# Patient Record
Sex: FEMALE | Race: WHITE | NOT HISPANIC OR LATINO | Employment: FULL TIME | URBAN - METROPOLITAN AREA
[De-identification: names, ages, dates, MRNs, and addresses within clinical notes are randomized per-mention and may not be internally consistent; named-entity substitution may affect disease eponyms.]

---

## 2023-07-24 ENCOUNTER — APPOINTMENT (OUTPATIENT)
Dept: URGENT CARE | Age: 58
End: 2023-07-24

## 2023-07-24 ENCOUNTER — OCC HEALTH (OUTPATIENT)
Dept: URGENT CARE | Age: 58
End: 2023-07-24

## 2023-07-24 DIAGNOSIS — Z11.1 SCREENING-PULMONARY TB: Primary | ICD-10-CM

## 2023-07-24 PROCEDURE — 86580 TB INTRADERMAL TEST: CPT | Performed by: NURSE PRACTITIONER

## 2023-07-26 ENCOUNTER — OCC HEALTH (OUTPATIENT)
Dept: URGENT CARE | Age: 58
End: 2023-07-26

## 2023-07-26 DIAGNOSIS — Z11.1 SCREENING FOR TUBERCULOSIS: Primary | ICD-10-CM

## 2023-07-26 LAB
INDURATION: 0 MM (ref 0–?)
SKIN TEST RESULT: NEGATIVE

## 2025-01-28 ENCOUNTER — TELEPHONE (OUTPATIENT)
Dept: OBGYN CLINIC | Facility: CLINIC | Age: 60
End: 2025-01-28

## 2025-01-28 NOTE — TELEPHONE ENCOUNTER
Outgoing call to patient to notify Dr Julio will be out of the office on 2/18/25. This PSR LDMTCB to reschedule appointment.

## 2025-01-30 ENCOUNTER — LAB ENCOUNTER (OUTPATIENT)
Dept: LAB | Facility: REFERENCE LAB | Age: 60
End: 2025-01-30
Attending: FAMILY MEDICINE
Payer: COMMERCIAL

## 2025-01-30 ENCOUNTER — OFFICE VISIT (OUTPATIENT)
Facility: CLINIC | Age: 60
End: 2025-01-30
Payer: COMMERCIAL

## 2025-01-30 VITALS
HEART RATE: 64 BPM | HEIGHT: 61.5 IN | DIASTOLIC BLOOD PRESSURE: 68 MMHG | BODY MASS INDEX: 20.5 KG/M2 | WEIGHT: 110 LBS | OXYGEN SATURATION: 98 % | SYSTOLIC BLOOD PRESSURE: 110 MMHG

## 2025-01-30 DIAGNOSIS — M54.50 CHRONIC RIGHT-SIDED LOW BACK PAIN WITHOUT SCIATICA: ICD-10-CM

## 2025-01-30 DIAGNOSIS — M21.962 DEFORMITY OF LEFT FOOT: ICD-10-CM

## 2025-01-30 DIAGNOSIS — M25.50 POLYARTHRALGIA: ICD-10-CM

## 2025-01-30 DIAGNOSIS — G89.29 CHRONIC RIGHT-SIDED LOW BACK PAIN WITHOUT SCIATICA: ICD-10-CM

## 2025-01-30 DIAGNOSIS — Z12.11 COLON CANCER SCREENING: ICD-10-CM

## 2025-01-30 DIAGNOSIS — Z01.00 ENCOUNTER FOR VISION SCREENING: ICD-10-CM

## 2025-01-30 DIAGNOSIS — Z00.00 PHYSICAL EXAM, ANNUAL: ICD-10-CM

## 2025-01-30 DIAGNOSIS — N81.4 UTERINE PROLAPSE: ICD-10-CM

## 2025-01-30 DIAGNOSIS — Z00.00 PHYSICAL EXAM, ANNUAL: Primary | ICD-10-CM

## 2025-01-30 DIAGNOSIS — Z12.31 ENCOUNTER FOR SCREENING MAMMOGRAM FOR MALIGNANT NEOPLASM OF BREAST: ICD-10-CM

## 2025-01-30 DIAGNOSIS — R79.89 ELEVATED TSH: ICD-10-CM

## 2025-01-30 LAB
ALBUMIN SERPL-MCNC: 4.6 G/DL (ref 3.2–4.8)
ALBUMIN/GLOB SERPL: 1.6 {RATIO} (ref 1–2)
ALP LIVER SERPL-CCNC: 51 U/L
ALT SERPL-CCNC: 18 U/L
ANION GAP SERPL CALC-SCNC: 8 MMOL/L (ref 0–18)
AST SERPL-CCNC: 31 U/L (ref ?–34)
BASOPHILS # BLD AUTO: 0.03 X10(3) UL (ref 0–0.2)
BASOPHILS NFR BLD AUTO: 0.6 %
BILIRUB SERPL-MCNC: 0.4 MG/DL (ref 0.2–1.1)
BUN BLD-MCNC: 20 MG/DL (ref 9–23)
BUN/CREAT SERPL: 21.5 (ref 10–20)
CALCIUM BLD-MCNC: 10 MG/DL (ref 8.7–10.4)
CHLORIDE SERPL-SCNC: 104 MMOL/L (ref 98–112)
CHOLEST SERPL-MCNC: 225 MG/DL (ref ?–200)
CO2 SERPL-SCNC: 28 MMOL/L (ref 21–32)
CREAT BLD-MCNC: 0.93 MG/DL
DEPRECATED RDW RBC AUTO: 41.9 FL (ref 35.1–46.3)
EGFRCR SERPLBLD CKD-EPI 2021: 70 ML/MIN/1.73M2 (ref 60–?)
EOSINOPHIL # BLD AUTO: 0.1 X10(3) UL (ref 0–0.7)
EOSINOPHIL NFR BLD AUTO: 2 %
ERYTHROCYTE [DISTWIDTH] IN BLOOD BY AUTOMATED COUNT: 12.7 % (ref 11–15)
EST. AVERAGE GLUCOSE BLD GHB EST-MCNC: 114 MG/DL (ref 68–126)
FASTING PATIENT LIPID ANSWER: NO
FASTING STATUS PATIENT QL REPORTED: NO
GLOBULIN PLAS-MCNC: 2.8 G/DL (ref 2–3.5)
GLUCOSE BLD-MCNC: 86 MG/DL (ref 70–99)
HBA1C MFR BLD: 5.6 % (ref ?–5.7)
HCT VFR BLD AUTO: 37.9 %
HCV AB SERPL QL IA: NONREACTIVE
HDLC SERPL-MCNC: 79 MG/DL (ref 40–59)
HGB BLD-MCNC: 12.3 G/DL
IMM GRANULOCYTES # BLD AUTO: 0.01 X10(3) UL (ref 0–1)
IMM GRANULOCYTES NFR BLD: 0.2 %
LDLC SERPL CALC-MCNC: 135 MG/DL (ref ?–100)
LYMPHOCYTES # BLD AUTO: 1.48 X10(3) UL (ref 1–4)
LYMPHOCYTES NFR BLD AUTO: 30.3 %
MCH RBC QN AUTO: 28.9 PG (ref 26–34)
MCHC RBC AUTO-ENTMCNC: 32.5 G/DL (ref 31–37)
MCV RBC AUTO: 89.2 FL
MONOCYTES # BLD AUTO: 0.42 X10(3) UL (ref 0.1–1)
MONOCYTES NFR BLD AUTO: 8.6 %
NEUTROPHILS # BLD AUTO: 2.85 X10 (3) UL (ref 1.5–7.7)
NEUTROPHILS # BLD AUTO: 2.85 X10(3) UL (ref 1.5–7.7)
NEUTROPHILS NFR BLD AUTO: 58.3 %
NONHDLC SERPL-MCNC: 146 MG/DL (ref ?–130)
OSMOLALITY SERPL CALC.SUM OF ELEC: 292 MOSM/KG (ref 275–295)
PLATELET # BLD AUTO: 242 10(3)UL (ref 150–450)
POTASSIUM SERPL-SCNC: 4 MMOL/L (ref 3.5–5.1)
PROT SERPL-MCNC: 7.4 G/DL (ref 5.7–8.2)
RBC # BLD AUTO: 4.25 X10(6)UL
SODIUM SERPL-SCNC: 140 MMOL/L (ref 136–145)
T4 FREE SERPL-MCNC: 0.8 NG/DL (ref 0.8–1.7)
TRIGL SERPL-MCNC: 66 MG/DL (ref 30–149)
TSI SER-ACNC: 9.95 UIU/ML (ref 0.55–4.78)
VLDLC SERPL CALC-MCNC: 12 MG/DL (ref 0–30)
WBC # BLD AUTO: 4.9 X10(3) UL (ref 4–11)

## 2025-01-30 PROCEDURE — 84443 ASSAY THYROID STIM HORMONE: CPT

## 2025-01-30 PROCEDURE — 85025 COMPLETE CBC W/AUTO DIFF WBC: CPT

## 2025-01-30 PROCEDURE — 90471 IMMUNIZATION ADMIN: CPT | Performed by: FAMILY MEDICINE

## 2025-01-30 PROCEDURE — 86376 MICROSOMAL ANTIBODY EACH: CPT

## 2025-01-30 PROCEDURE — 80053 COMPREHEN METABOLIC PANEL: CPT

## 2025-01-30 PROCEDURE — 83036 HEMOGLOBIN GLYCOSYLATED A1C: CPT

## 2025-01-30 PROCEDURE — 86803 HEPATITIS C AB TEST: CPT

## 2025-01-30 PROCEDURE — 90677 PCV20 VACCINE IM: CPT | Performed by: FAMILY MEDICINE

## 2025-01-30 PROCEDURE — 99386 PREV VISIT NEW AGE 40-64: CPT | Performed by: FAMILY MEDICINE

## 2025-01-30 PROCEDURE — 36415 COLL VENOUS BLD VENIPUNCTURE: CPT

## 2025-01-30 PROCEDURE — 86800 THYROGLOBULIN ANTIBODY: CPT

## 2025-01-30 PROCEDURE — 84439 ASSAY OF FREE THYROXINE: CPT

## 2025-01-30 PROCEDURE — 80061 LIPID PANEL: CPT

## 2025-01-30 PROCEDURE — 99203 OFFICE O/P NEW LOW 30 MIN: CPT | Performed by: FAMILY MEDICINE

## 2025-01-30 RX ORDER — HYDROXYCHLOROQUINE SULFATE 200 MG/1
1 TABLET, FILM COATED ORAL 2 TIMES DAILY
COMMUNITY

## 2025-01-30 RX ORDER — CETIRIZINE HYDROCHLORIDE 10 MG/1
10 TABLET ORAL DAILY
COMMUNITY
Start: 2024-08-29

## 2025-01-30 NOTE — PROGRESS NOTES
HPI:   Mirlande Tillman is a 59 year old female who presents for a complete physical exam.     Prior PCP was at [facility] for 30 years.     Has uterine prolapse. Recently diagnosed.     Chronic right sided LBP. Off and on. Changes with different positions. Has focal spot that is painful. No radiculopathy. No numbness or tingling.     Has growth of left forefoot near 1st MTP. Saw podiatrist previously and has CTM. Remains very active w/o major issues for now.     Suspected RA dating back many years. No formal dx. On hydroxychloroquine. Saw rheumatology 20 years ago. Takes tablet every other day now.    Last pap: Maybe 2-3 years ago at [facility] and negative per patient  Last mammogram: Last year w/ ultrasound 2/2 dense breasts and negative. Hx of breast biopsy    Previous colonoscopy: 5 years ago at [facility]. Rec'd repeat in 10 years per patient.    Family hx of breast, ovarian, cervical or colon CA: See FH  Diet and exercise: Very active. Does long distance cycling during summer. Runs. Eats well.   Immunizations:  Tdap: UTD, Flu: UTD, PCV20: Never, Shingles: UTD, Covid: UTD    REVIEW OF SYSTEMS:   GENERAL: feels well otherwise. See HPI  SKIN: denies any unusual skin lesions  EYES: no vision problems  BREAST: negative  LUNGS: denies shortness of breath  CARDIOVASCULAR: denies chest pain  GI: denies abdominal pain,  No constipation or diarrhea, no hematochezia or melena  : see HPI  NEURO: denies headaches  PSYCHE: denies depression or anxiety          Current Outpatient Medications   Medication Sig Dispense Refill    cetirizine 10 MG Oral Tab Take 1 tablet (10 mg total) by mouth daily.      hydroxychloroquine 200 MG Oral Tab Take 1 tablet (200 mg total) by mouth 2 (two) times daily. Takes every 2 days       Allergies[1]   Past Medical History:    Asthma (HCC)    Chronic sinusitis    Rheumatoid arthritis (HCC)      History reviewed. No pertinent surgical history.   History reviewed. No pertinent family history.    Social History:   Social History     Socioeconomic History    Marital status:    Tobacco Use    Smoking status: Never    Smokeless tobacco: Never   Vaping Use    Vaping status: Never Used   Substance and Sexual Activity    Alcohol use: Not Currently    Drug use: Never            EXAM:     Wt Readings from Last 6 Encounters:   01/30/25 110 lb (49.9 kg)     Body mass index is 20.45 kg/m².   /68   Pulse 64   Ht 5' 1.5\" (1.562 m)   Wt 110 lb (49.9 kg)   SpO2 98%   BMI 20.45 kg/m²       GENERAL: well developed, well nourished, in no apparent distress   SKIN: no rashes, no suspicious lesions  HEENT: atraumatic, normocephalic, throat clear; normal dentition. MMM. TM & EAC normal b/l.   EYES: PERRLA, EOMI, conjunctiva are clear  NECK: supple, no adenopathy or thyroid masses   LUNGS: clear to auscultation  CARDIO: RRR without murmur  GI: good bowel sounds, no masses, HSM or tenderness  EXTREMITIES: no edema. Small immobile bony growth on left dorsal 1st MTP joint  NEURO: Alert & oriented, motor & sensation grossly intact and symmetric    No results found for: \"CHOLEST\", \"HDL\", \"LDL\", \"TRIGLY\"   ASSESSMENT AND PLAN:   Mirlande Tillman is a 59 year old female who presents for a complete physical exam.  Encounter Diagnoses   Name Primary?    Physical exam, annual Yes    Encounter for screening mammogram for malignant neoplasm of breast     Colon cancer screening     Polyarthralgia     Chronic right-sided low back pain without sciatica     Deformity of left foot     Uterine prolapse     Encounter for vision screening      Orders Placed This Encounter   Procedures    Hemoglobin A1C    CBC With Differential With Platelet    Comp Metabolic Panel (14)    TSH W Reflex To Free T4    Lipid Panel    HCV Antibody    Prevnar 20 (PCV20) [20590]       -Uterine prolapse: Recommend establishing with gynecology. Info provided for our gynecologists here in OP. She is likely due for repeat pap smear as well.    -Polyarthralgia: No clear definitive dx per patient. Rec'd repeat rheumatology evaluation. Due for repeat ophtho evaluation while on plaquenil.   -Chronic LBP: Recommend trial of PT. Referral generated. Defer XR for now, although can order if she prefers.   -Left foot deformity: CTM for now. Plan to refer back to podiatry if becomes more bothersome.   -Baseline labs ordered.   -Medical record release form signed today to obtain pap smear, breast imaging, and colonoscopy results from [facility].   -PCV20 given today.     Discussed with patient the following:  -Breast cancer screening/mammograms and clinical breast exams  -Cervical cancer screening/pap smears  -Colon cancer screening/colonoscopy  -Adequate calcium and Vitamin D intake to prevent osteoporosis  -Bone density screening for osteopenia/osteoporosis  -Healthy diet including adequate intake of vegetables and fruits, appropriate portion sizes, minimizing highly concentrated carbohydrate foods  -Exercising 30 minutes a day most days of the week   -Diabetes screening    -Cholesterol screening   -Recommendation for yearly influenza vaccine  -Need for Tdap once as an adult and Td booster every 10 years  -Need for Zoster vaccine for patients >= 50  -Hepatitis C screening    All questions were answered during the visit and the patient verbalizes understanding. She will return in one year for next WWE or sooner as needed.    Meds & Refills for this Visit:  Requested Prescriptions      No prescriptions requested or ordered in this encounter       Imaging & Consults:  RHEUMATOLOGY - INTERNAL  PHYSICAL THERAPY EXTERNAL  OPHTHALMOLOGY - INTERNAL  PCV20 VACCINE FOR INTRAMUSCULAR USE    Wally Colin DO  1/30/2025  2:37 PM         [1]   Allergies  Allergen Reactions    Clarithromycin HIVES

## 2025-01-31 DIAGNOSIS — R79.89 ELEVATED TSH: Primary | ICD-10-CM

## 2025-01-31 LAB
THYROGLOB SERPL-MCNC: <15 U/ML (ref ?–60)
THYROPEROXIDASE AB SERPL-ACNC: 4793 U/ML (ref ?–60)

## 2025-02-03 DIAGNOSIS — E06.3 HASHIMOTO'S THYROIDITIS: Primary | ICD-10-CM

## 2025-02-03 RX ORDER — LEVOTHYROXINE SODIUM 50 UG/1
50 TABLET ORAL
Qty: 60 TABLET | Refills: 0 | Status: SHIPPED | OUTPATIENT
Start: 2025-02-03

## 2025-03-04 ENCOUNTER — OFFICE VISIT (OUTPATIENT)
Dept: OBGYN CLINIC | Facility: CLINIC | Age: 60
End: 2025-03-04
Payer: COMMERCIAL

## 2025-03-04 VITALS
SYSTOLIC BLOOD PRESSURE: 112 MMHG | BODY MASS INDEX: 20.5 KG/M2 | HEIGHT: 61.5 IN | WEIGHT: 110 LBS | DIASTOLIC BLOOD PRESSURE: 72 MMHG

## 2025-03-04 DIAGNOSIS — Z12.4 SCREENING FOR CERVICAL CANCER: ICD-10-CM

## 2025-03-04 DIAGNOSIS — N81.11 CYSTOCELE, MIDLINE: ICD-10-CM

## 2025-03-04 DIAGNOSIS — Z01.419 ENCOUNTER FOR ANNUAL ROUTINE GYNECOLOGICAL EXAMINATION: Primary | ICD-10-CM

## 2025-03-04 PROCEDURE — 99204 OFFICE O/P NEW MOD 45 MIN: CPT | Performed by: OBSTETRICS & GYNECOLOGY

## 2025-03-04 PROCEDURE — 87624 HPV HI-RISK TYP POOLED RSLT: CPT | Performed by: OBSTETRICS & GYNECOLOGY

## 2025-03-04 PROCEDURE — 88175 CYTOPATH C/V AUTO FLUID REDO: CPT | Performed by: OBSTETRICS & GYNECOLOGY

## 2025-03-04 PROCEDURE — 99396 PREV VISIT EST AGE 40-64: CPT | Performed by: OBSTETRICS & GYNECOLOGY

## 2025-03-04 NOTE — PROGRESS NOTES
ANNUAL GYN EXAM  [facility]    CHIEF COMPLAINT:    Chief Complaint   Patient presents with    Annual     Prolapsed uterus      HISTORY OF PRESENT ILLNESS:   Mirlande Tillman is a 59 year old female   who presents for annual well woman visit.  She is feeling well without complaints.  History uterine prolapse noted about 2-3 months ago. Feels some discomfort, heavy and rubbing. States has been able to replace.     Age 49 at menopause. Denies postmenopausal bleeding, has not used HRT. Has not had to splint. Denies obstruction    PAST GYNECOLOGICAL HISTORY & OTHER PREVENTIVE MEDICINE  LMP: No LMP recorded. Patient is postmenopausal.  Period Cycle (Days): postmenopausal (3/4/2025  3:15 PM)  Use of Birth Control (if yes, specify type): Postmenopausal (3/4/2025  3:15 PM)  Hx Prior Abnormal Pap: No (3/4/2025  3:15 PM)  Pap Date: 17 (3/4/2025  3:15 PM)  Pap Result Notes: neg (3/4/2025  3:15 PM)  Follow Up Recommendation: mammo done within the last year per pt wnl (3/4/2025  3:15 PM)    Complications: denies postmenopausal bleeding   Gravita/Parity:   Contraception: current -menopausal; Previous - oral contraceptive pill  Sexually transmitted disease history:None  Number of sexual partners: no recent penetration, 32 yrs, Lifetime partners:   Pap history:  Last pap/result: NILM ; history abnormals: no abnormal   Date of last mammogram: 2024; history abnormals history benign biopsy  Last Colonoscopy: up to date per pt; history abnormals   Abuse history: denies  Vaginal discharge: denies  Bladder symptoms: per HPI; history occasional stress incontinence    PAST MEDICAL HISTORY:   Past Medical History:    Asthma (HCC)    Chronic sinusitis    Rheumatoid arthritis (HCC)        PAST SURGICAL HISTORY:   History reviewed. No pertinent surgical history.     PAST OB HISTORY:  OB History    Para Term  AB Living   2 2 2     2   SAB IAB Ectopic Multiple Live Births           2      #  Outcome Date GA Lbr Ld/2nd Weight Sex Type Anes PTL Lv   2 Term    8 lb (3.629 kg)  NORMAL SPONT      1 Term      NORMAL SPONT          CURRENT MEDICATIONS:      Current Outpatient Medications:     Ergocalciferol (VITAMIN D OR), Take by mouth., Disp: , Rfl:     Omega-3 Fatty Acids (FISH OIL OR), Take by mouth., Disp: , Rfl:     CALCIUM OR, Take by mouth., Disp: , Rfl:     MAGNESIUM OR, Take by mouth., Disp: , Rfl:     levothyroxine 50 MCG Oral Tab, Take 1 tablet (50 mcg total) by mouth before breakfast., Disp: 60 tablet, Rfl: 0    cetirizine 10 MG Oral Tab, Take 1 tablet (10 mg total) by mouth daily., Disp: , Rfl:     hydroxychloroquine 200 MG Oral Tab, Take 1 tablet (200 mg total) by mouth 2 (two) times daily. Takes every 2 days, Disp: , Rfl:     ALLERGIES:  Allergies[1]    SOCIAL HISTORY:  Social History     Socioeconomic History    Marital status:    Tobacco Use    Smoking status: Never    Smokeless tobacco: Never   Vaping Use    Vaping status: Never Used   Substance and Sexual Activity    Alcohol use: Not Currently    Drug use: Never    Sexual activity: Not Currently   Other Topics Concern    Blood Transfusions No       FAMILY HISTORY:  Family History   Problem Relation Age of Onset    Uterine Cancer Neg     Ovarian Cancer Neg     Breast Cancer Neg      ASSESSMENTS:  REVIEW OF SYSTEMS:  CONSTITUTIONAL:  negative for fevers, chills and sweats    EYES:  negative for  blurred vision and visual disturbance  RESPIRATORY:  negative for  cough and shortness of breath  CARDIOVASCULAR:  negative for  chest pain, palpitations  GASTROINTESTINAL:  No constipation/diarrhea, no pain  GENITOURINARY:  See History of Present Illness  INTEGUMENT/BREAST: Breast: no masses, no nipple discharge  ENDOCRINE:  negative for acne, constipation, diarrhea, cold intolerance, heat intolerance, fatigue, hair loss, weight gain and weight loss  MUSCULOSKELETAL:  negative for joint pain  NEUROLOGICAL:  negative for  dizziness/lightheadedness and headaches  BEHAVIOR/PSYCH:  Negative for depressed mood, anhedonia and anxiety    PHYSICAL EXAM  No LMP recorded. Patient is postmenopausal.   Vitals:    03/04/25 1520   BP: 112/72   Weight: 110 lb (49.9 kg)   Height: 61.5\"       CONSTITUTIONAL: Awake, alert, cooperative, no apparent distress, and appears stated age   NECK:  symmetrical, trachea midline, no adenopathy, thyroid symmetric, not enlarged   LUNGS: respiration unlabored  CARDIOVASCULAR: no peripheral edema or varicosities, skin warm and dry  ABDOMEN: Soft, non-distended, non-tender, no masses palpated     GENITAL/URINARY:    External Genitalia:  General appearance; normal, Hair distribution; normal, Lesions absent   Urethral Meatus:  Lesions absent, Prolapse absent  Bladder:  Tenderness absent, Cystocele absent  Vagina:  Discharge absent, Lesions absent, Pelvic support  - cystocele present with descent to level of hymenal ring with valsalva. No significant rectocele or uterine prolapse appreciated.   Cervix:  Lesions absent, Discharge absent, Tenderness absent  Uterus:  Size normal, Masses absent, Tenderness absent  Adnexa:  Masses absent, Tenderness absent  Anus/Perineum:  Lesions absent    MUSCULOSKELETAL: There is no redness, warmth, or swelling of the joints.  Full range of motion noted.  Motor strength is 5 out of 5 all extremities bilaterally.  Tone is normal.  NEUROLOGIC: Patient is awake, alert and oriented to name, place and time.  Casual gait is normal.  SKIN: no bruising or bleeding and no rashes  PSYCHIATRIC: Behavior:  Appropriate  Mood:  appropriate  ASSESSMENT AND PLAN:  1. Encounter for annual routine gynecological examination      2. Screening for cervical cancer    - ThinPrep PAP Smear; Future  - Hpv Dna  High Risk , Thin Prep Collect; Future  - ThinPrep PAP Smear  - Hpv Dna  High Risk , Thin Prep Collect    3. Cystocele, midline  Referred to urogyn for further evaluation and managment. Briefly reviewed  treatment options - pessary vs surgery.  - Urogynecology Referral - In Network      follow up 1 yr or as needed  Marlyn Villarreal MD         [1]   Allergies  Allergen Reactions    Clarithromycin HIVES

## 2025-03-05 LAB — HPV E6+E7 MRNA CVX QL NAA+PROBE: NEGATIVE

## 2025-04-02 RX ORDER — LEVOTHYROXINE SODIUM 50 UG/1
50 TABLET ORAL
Qty: 30 TABLET | Refills: 0 | Status: SHIPPED | OUTPATIENT
Start: 2025-04-02 | End: 2025-04-04

## 2025-04-03 ENCOUNTER — PATIENT MESSAGE (OUTPATIENT)
Dept: OBGYN CLINIC | Facility: CLINIC | Age: 60
End: 2025-04-03

## 2025-04-03 ENCOUNTER — LAB ENCOUNTER (OUTPATIENT)
Dept: LAB | Age: 60
End: 2025-04-03
Attending: FAMILY MEDICINE
Payer: COMMERCIAL

## 2025-04-03 DIAGNOSIS — E06.3 HASHIMOTO'S THYROIDITIS: ICD-10-CM

## 2025-04-03 LAB — TSI SER-ACNC: 2.53 UIU/ML (ref 0.55–4.78)

## 2025-04-03 PROCEDURE — 36415 COLL VENOUS BLD VENIPUNCTURE: CPT

## 2025-04-03 PROCEDURE — 84443 ASSAY THYROID STIM HORMONE: CPT

## 2025-04-04 RX ORDER — LEVOTHYROXINE SODIUM 50 UG/1
50 TABLET ORAL
Qty: 90 TABLET | Refills: 3 | Status: SHIPPED | OUTPATIENT
Start: 2025-04-04

## 2025-04-30 ENCOUNTER — OFFICE VISIT (OUTPATIENT)
Dept: UROLOGY | Facility: HOSPITAL | Age: 60
End: 2025-04-30
Attending: OBSTETRICS & GYNECOLOGY
Payer: COMMERCIAL

## 2025-04-30 VITALS — WEIGHT: 108 LBS | RESPIRATION RATE: 14 BRPM | HEIGHT: 61.5 IN | BODY MASS INDEX: 20.13 KG/M2

## 2025-04-30 DIAGNOSIS — N81.2 UTEROVAGINAL PROLAPSE, INCOMPLETE: Primary | ICD-10-CM

## 2025-04-30 DIAGNOSIS — N81.84 PELVIC MUSCLE WASTING: ICD-10-CM

## 2025-04-30 DIAGNOSIS — N95.2 POSTMENOPAUSAL ATROPHIC VAGINITIS: ICD-10-CM

## 2025-04-30 DIAGNOSIS — N39.3 FEMALE STRESS INCONTINENCE: ICD-10-CM

## 2025-04-30 LAB
BLOOD URINE: NEGATIVE
CONTROL RUN WITHIN 24 HOURS?: YES
LEUKOCYTE ESTERASE URINE: NEGATIVE
NITRITE URINE: NEGATIVE

## 2025-04-30 PROCEDURE — 87086 URINE CULTURE/COLONY COUNT: CPT | Performed by: OBSTETRICS & GYNECOLOGY

## 2025-04-30 PROCEDURE — 99202 OFFICE O/P NEW SF 15 MIN: CPT

## 2025-04-30 PROCEDURE — 81002 URINALYSIS NONAUTO W/O SCOPE: CPT | Performed by: OBSTETRICS & GYNECOLOGY

## 2025-04-30 RX ORDER — ESTRADIOL 0.1 MG/G
CREAM VAGINAL
Qty: 42.5 G | Refills: 0 | Status: SHIPPED | OUTPATIENT
Start: 2025-04-30

## 2025-04-30 NOTE — PROGRESS NOTES
Kaelyn Contreras,   2025     Referred by Dr. Villarreal  Pt here with self    Chief Complaint   Patient presents with    Prolapse    Other     SAMUEL for many years, noted with running       HPI:  +SAMUEL  No UUI  Nocturia x0  +prolapse sx  H/o dyspareunia, not sexually active  Reg bowels    PRIOR TREATMENTS:    Kegels    no UTIs  No gross hematuria    , vdx2    Vitals:  Resp 14   Ht 61.5\"   Wt 108 lb (49 kg)   BMI 20.08 kg/m²      HISTORY:  Past Medical History[1]   Past Surgical History[2]   Family History[3]   Short Social Hx on File[4]     Allergies:  Allergies[5]    Medications:  Medications Prior to Visit[6]    Urogynecology Summary:  Urogynecology Summary  Prolapse: Yes  SAMUEL: Yes  Urge Incontinence: No (Had some years ago, denies now.)  Nocturia Frequency: 0  Frequency: Greater than 3 hours  Incomplete emptying: No  Constipation: No  Wears pad day?: 0  Wears Pad Night?: 0  Activities are limited by UI/POP?: Yes (some pain with walking at night.  Avoiding bike riding.)  Currently Sexually Active: No  Avoids sexual activity due to: Other    Review of Systems:    A comprehensive 12 point review of systems was completed.  Pertinent positives noted in the the HPI.  No CP  No SOB    GENERAL EXAM:  GENERAL:  Alert and Oriented, and NAD  HEENT:  Normal, no lesions  LUNGS:  Normal effort  HEART:  RRR  ABDOMEN: soft, no mass, no hernia  EXTREM:  Normal, no edema  SKIN:  Normal, no lesions    PELVIC EXAM:  Ext. Gen: some atrophy, no lesions  Urethra: +atrophy, nontender  Bladder:+fullness, nontender  Vagina: some atrophy  Cervix: no bleeding, no lesions, nontender  Uterus: +mobile  Adnexa:no masses, nontender  Perineum: nontender  Anus: wnl  Rectum: defer    PELVIS FLOOR NEUROMUSCULAR FUNCTION:  Strength:  1 and Unable to hold greater than 3 sec  Perineal Sensation:  Normal      PELVIC SUPPORT:  [place]:  2  Ant:  2-3  Post:  2  CST:  negative  UVJ: +hypermobile    Pt examined with chaperone, RN    Impression/Plan:     ICD-10-CM    1. Uterovaginal prolapse, incomplete  N81.2       2. Female stress incontinence  N39.3 POCT urinalysis dipstick[63715]     Urine Culture, Routine      3. Pelvic muscle wasting  N81.84       4. Postmenopausal atrophic vaginitis  N95.2 estradiol (ESTRACE) 0.1 MG/GM Vaginal Cream          Discussion Items:   Urodynamics and cystoscopy for evaluation of LUTS  Nonsurgical and surgical treatments for Stress Urinary Incontinence  Nonsurgical and surgical treatments for POP  Pelvic muscle rehabilitation including pelvic floor PT  Topical estrogen therapy for treating UGA  Surgical Discussion: We discussed the risks, benefits, goals and alternatives to surgical approaches for pelvic floor disorders including, but not limited to, bleeding, infection, pelvic organ damage, recurrent prolapse, recurrent stress incontinence, de xavier OAB, pain, sexual dysfunction, voiding dysfunction, long-term catheterization and re-operation.  Post-op restrictions and expectations reviewed.  Discussed dietary and behavioral modification, discussed pharmacologic and nonpharmacologic mgmt options for urinary symptoms. Discussed dietary & weight management with potential improvements in symptoms with weight loss.    Discussed mgmt of vulvovaginal atrophy with vaginal estrogen cream. Reviewed associated benefits, risks, alternatives, and goals. Recommend low dose twice weekly mgmt     Discussed management options for SAMUEL including expectant management, pelvic floor PT, pessary, and surgery  Discussed risks and benefits associated with each option   Discussed urodynamic testing & results    Discussed management of pelvic organ prolapse including but not limited to behavioral modifications, conservative options, and surgical management.   Discussed pessary management including benefits and risks. Discussed importance of keeping regularly scheduled pessary checks in prevention of complications related to pessary use.       Diagnostic  Items:  Urine testing    Medications Discussed:  Estrace Cream    Treatment Plan, Non-surgical:   RN teaching/pt education done  Pessary  Estrace / Premarin cream    Treatment Plan, Surgical:   None      Trial pessary  initiate vag estrogen  Pelvic exercises    Pt verbalizes understanding of all above discussed information. All questions answered. She agrees to plan    Return for pessary trial.    Kaelyn Contreras, DO, FACOG, FACS      Discussion undertaken in English, infp provided      The 21st Century Cures Act makes medical notes like these available to patients in the interest of transparency. However, be advised this is a medical document. It is intended as peer to peer communication. It is written in medical language and may contain abbreviations or verbiage that are unfamiliar. It may appear blunt or direct. Medical documents are intended to carry relevant information, facts as evident, and the clinical opinion of the practitioner.          [1]   Past Medical History:   Asthma (HCC)    Chronic sinusitis    Rheumatoid arthritis (HCC)   [2]   Past Surgical History:  Procedure Laterality Date    Colonoscopy     [3]   Family History  Problem Relation Age of Onset    Uterine Cancer Neg     Ovarian Cancer Neg     Breast Cancer Neg    [4]   Social History  Socioeconomic History    Marital status:    Tobacco Use    Smoking status: Never    Smokeless tobacco: Never   Vaping Use    Vaping status: Never Used   Substance and Sexual Activity    Alcohol use: Not Currently    Drug use: Never    Sexual activity: Not Currently   Other Topics Concern    Blood Transfusions No   [5]   Allergies  Allergen Reactions    Clarithromycin HIVES   [6]   Outpatient Medications Prior to Visit   Medication Sig Dispense Refill    levothyroxine 50 MCG Oral Tab Take 1 tablet (50 mcg total) by mouth before breakfast. 90 tablet 3    Ergocalciferol (VITAMIN D OR) Take by mouth.      Omega-3 Fatty Acids (FISH OIL OR) Take by mouth.       MAGNESIUM OR Take by mouth.      cetirizine 10 MG Oral Tab Take 1 tablet (10 mg total) by mouth in the morning.      hydroxychloroquine 200 MG Oral Tab Take 1 tablet (200 mg total) by mouth in the morning and 1 tablet (200 mg total) before bedtime. Takes every 2 days.      CALCIUM OR Take by mouth. (Patient not taking: Reported on 4/30/2025)       No facility-administered medications prior to visit.

## 2025-06-02 ENCOUNTER — OFFICE VISIT (OUTPATIENT)
Dept: UROLOGY | Facility: HOSPITAL | Age: 60
End: 2025-06-02
Attending: PHYSICIAN ASSISTANT
Payer: COMMERCIAL

## 2025-06-02 VITALS — RESPIRATION RATE: 16 BRPM | BODY MASS INDEX: 20 KG/M2 | WEIGHT: 108 LBS

## 2025-06-02 DIAGNOSIS — N81.2 UTEROVAGINAL PROLAPSE, INCOMPLETE: Primary | ICD-10-CM

## 2025-06-02 DIAGNOSIS — N95.2 POSTMENOPAUSAL ATROPHIC VAGINITIS: ICD-10-CM

## 2025-06-02 DIAGNOSIS — N81.84 PELVIC MUSCLE WASTING: ICD-10-CM

## 2025-06-02 DIAGNOSIS — N39.3 FEMALE STRESS INCONTINENCE: ICD-10-CM

## 2025-06-02 PROCEDURE — 57160 INSERT PESSARY/OTHER DEVICE: CPT

## 2025-06-02 NOTE — PROGRESS NOTES
Pt here for pessary trial due to bulge  Wants to exhaust conservative options  Denies UI complaints  +SAMUEL complaints  Denies UTI's  Bowels regular     Pt is using vaginal cream twice weekly     She is not currently interested in surgical management of her pelvic organ prolapse.  Wants to try home care    Vitals:  Vitals:    06/02/25 1416   Resp: 16       GENERAL EXAM:  GENERAL:  NAD  HEAD: NC/AT  EYES: symmetric bilaterally. No icterus. Sclera w/o injection  NECK: no masses  LUNGS:  Normal resp effort  ABDOMEN:  Soft, no mass  MUSK: normal gait, ROM wnl. No edema  PSYCH: A&Ox3. Recent and remote memory intact    PELVIC EXAM: PRINCESS Nogueira, present for exam as a chaperone  Ext. Gen: some atrophy, no lesions  Urethra: +atrophy, nontender  Bladder:+fullness, nontender  Vagina: some atrophy  Cervix: no bleeding, no lesions, nontender  Uterus: +mobile  Adnexa:no masses, nontender  Perineum: nontender  Anus: wnl  Rectum: defer     PELVIS FLOOR NEUROMUSCULAR FUNCTION:  Strength:  1 and Unable to hold greater than 3 sec  Perineal Sensation:  Normal        PELVIC SUPPORT:  [place]:  2  Ant:  2-3  Post:  2      A #3 large knob incontinence dish pessary was placed without difficulty.  Patient stated it was comfortable and supportive  Able to void freely  Patient taught and able to demonstrate self removal and insertion of pessary    Impression/Plan:    ICD-10-CM    1. Uterovaginal prolapse, incomplete  N81.2       2. Female stress incontinence  N39.3       3. Postmenopausal atrophic vaginitis  N95.2       4. Pelvic muscle wasting  N81.84           Discussion Items:   Discussed mgmt of vulvovaginal atrophy with vaginal estrogen cream. Reviewed associated benefits, risks, alternatives, and goals. Recommend low dose twice weekly mgmt   Discussed management of pelvic organ prolapse including but not limited to behavioral modifications, conservative options, and surgical management.   Discussed pessary management including benefits and  risks. Discussed importance of keeping regularly scheduled pessary checks in prevention of complications related to pessary use.     Continue pessary management home care  Continue vaginal estrogen cream twice weekly  Bladder diet/drill  Bowel regimen reviewed  Call with s/sx of UTI, problems with pessary   All questions answered  She understands and agrees to plan      Return in about 4 weeks (around 6/30/2025) for pessary care, sooner prn .      Tracey Chau PA-C    The 21st Century Cures Act makes medical notes like these available to patients in the interest of transparency. However, be advised this is a medical document. It is intended as peer to peer communication. It is written in medical language and may contain abbreviations or verbiage that are unfamiliar. It may appear blunt or direct. Medical documents are intended to carry relevant information, facts as evident, and the clinical opinion of the practitioner.

## 2025-07-14 ENCOUNTER — OFFICE VISIT (OUTPATIENT)
Dept: UROLOGY | Facility: HOSPITAL | Age: 60
End: 2025-07-14
Attending: PHYSICIAN ASSISTANT
Payer: COMMERCIAL

## 2025-07-14 VITALS — BODY MASS INDEX: 20.13 KG/M2 | WEIGHT: 108 LBS | HEIGHT: 61.5 IN | RESPIRATION RATE: 16 BRPM

## 2025-07-14 DIAGNOSIS — N89.8 VAGINAL DISCHARGE: ICD-10-CM

## 2025-07-14 DIAGNOSIS — N81.2 UTEROVAGINAL PROLAPSE, INCOMPLETE: ICD-10-CM

## 2025-07-14 DIAGNOSIS — N81.84 PELVIC MUSCLE WASTING: ICD-10-CM

## 2025-07-14 DIAGNOSIS — N39.3 FEMALE STRESS INCONTINENCE: Primary | ICD-10-CM

## 2025-07-14 DIAGNOSIS — N95.2 POSTMENOPAUSAL ATROPHIC VAGINITIS: ICD-10-CM

## 2025-07-14 PROCEDURE — 99212 OFFICE O/P EST SF 10 MIN: CPT

## 2025-07-14 PROCEDURE — 81514 NFCT DS BV&VAGINITIS DNA ALG: CPT | Performed by: PHYSICIAN ASSISTANT

## 2025-07-14 NOTE — PROGRESS NOTES
Pt presents to follow up bulge  Doing well with #3 large knob incontinence dish pessary, home care  Reports pessary is comfortable but has had some SAMUEL with walking with pessary in place   Some discharge  Denies bleeding  Denies s/sx of UTI  Bowels regular   Pt is removing pessary at home once a week   Pt is using vaginal estrogen cream twice weekly    Happy with pessary, feels supported  She is not currently interested in surgical management of her pelvic organ prolapse.    Urogynecology Summary:  Urogynecology Summary  Prolapse: No  SAMUEL: No  Nocturia Frequency: 0  Frequency: 2 - 3 hours  Incomplete emptying:  (?)  Constipation: No  Wears pad day?: 0  Wears Pad Night?: 0  Activities are limited by UI/POP?: No  Currently Sexually Active: No      Vitals:  Vitals:    07/14/25 1352   Resp: 16       GENERAL EXAM:  GENERAL:  NAD  HEAD: NC/AT  EYES: symmetric bilaterally. No icterus. Sclera w/o injection  NECK: no masses  LUNGS:  Normal resp effort  ABDOMEN:  Soft, no mass  MUSK: normal gait, ROM wnl. No edema  PSYCH: A&Ox3. Recent and remote memory intact    PELVIC EXAM: PRINCESS Nogueira, present for exam as a chaperone  Ext. Gen: some atrophy, no lesions  Urethra: +atrophy, nontender  Bladder:+fullness, nontender  Vagina: some atrophy  Cervix: no bleeding, no lesions, nontender  Uterus: +mobile  Adnexa:no masses, nontender  Perineum: nontender  Anus: wnl  Rectum: defer     PELVIS FLOOR NEUROMUSCULAR FUNCTION:  Strength:  1 and Unable to hold greater than 3 sec  Perineal Sensation:  Normal        PELVIC SUPPORT:  [place]:  2  Ant:  2-3  Post:  2    Her pessary was removed, cleaned, and given back to the patient  #4 large knob incontinence dish pessary inserted, comfortable, supportive, -CST  Patient declined demonstration of self removal and insertion   Patient able to void freely     Impression/Plan:    ICD-10-CM    1. Female stress incontinence  N39.3       2. Uterovaginal prolapse, incomplete  N81.2       3. Postmenopausal  atrophic vaginitis  N95.2       4. Pelvic muscle wasting  N81.84             Discussion Items:   Discussed mgmt of vulvovaginal atrophy with vaginal estrogen cream. Reviewed associated benefits, risks, alternatives, and goals. Recommend low dose 2-3x weekly mgmt   Discussed management of pelvic organ prolapse including but not limited to behavioral modifications, conservative options, and surgical management.   Discussed pessary management including benefits and risks. Discussed importance of keeping regularly scheduled pessary checks in prevention of complications related to pessary use.     Continue pessary management, home care  Continue vag estrogen 2-3x weekly  Daily pelvic exercises  Bowel management reviewed  Call with s/sx of UTI, problems with pessary   All questions answered  Pt understands and agrees to plan       Return in about 3 months (around 10/14/2025) for pessary care, sooner prn .      Tracey Chau PA-C      The 21st Century Cures Act makes medical notes like these available to patients in the interest of transparency. However, be advised this is a medical document. It is intended as peer to peer communication. It is written in medical language and may contain abbreviations or verbiage that are unfamiliar. It may appear blunt or direct. Medical documents are intended to carry relevant information, facts as evident, and the clinical opinion of the practitioner.

## 2025-07-15 LAB
BV BACTERIA DNA VAG QL NAA+PROBE: NEGATIVE
C GLABRATA DNA VAG QL NAA+PROBE: NEGATIVE
C KRUSEI DNA VAG QL NAA+PROBE: NEGATIVE
CANDIDA DNA VAG QL NAA+PROBE: NEGATIVE
T VAGINALIS DNA VAG QL NAA+PROBE: NEGATIVE

## 2025-07-25 ENCOUNTER — HOSPITAL ENCOUNTER (OUTPATIENT)
Age: 60
Discharge: HOME | End: 2025-07-25
Payer: COMMERCIAL

## 2025-07-25 VITALS
RESPIRATION RATE: 22 BRPM | TEMPERATURE: 98 F | DIASTOLIC BLOOD PRESSURE: 66 MMHG | HEART RATE: 63 BPM | OXYGEN SATURATION: 100 % | SYSTOLIC BLOOD PRESSURE: 123 MMHG

## 2025-07-25 DIAGNOSIS — N30.01 ACUTE CYSTITIS WITH HEMATURIA: Primary | ICD-10-CM

## 2025-07-25 DIAGNOSIS — N81.4 UTERINE PROLAPSE: ICD-10-CM

## 2025-07-25 DIAGNOSIS — R39.9 UTI SYMPTOMS: ICD-10-CM

## 2025-07-25 LAB
BILIRUB UR QL STRIP: NEGATIVE
COLOR UR: YELLOW
GLUCOSE UR STRIP-MCNC: NEGATIVE MG/DL
KETONES UR STRIP-MCNC: NEGATIVE MG/DL
NITRITE UR QL STRIP: NEGATIVE
PH UR STRIP: 7
PROT UR STRIP-MCNC: 30 MG/DL
SP GR UR STRIP: 1.01
UROBILINOGEN UR STRIP-ACNC: <2 MG/DL

## 2025-07-25 PROCEDURE — 81002 URINALYSIS NONAUTO W/O SCOPE: CPT | Performed by: PHYSICIAN ASSISTANT

## 2025-07-25 PROCEDURE — 99214 OFFICE O/P EST MOD 30 MIN: CPT | Performed by: PHYSICIAN ASSISTANT

## 2025-07-25 RX ORDER — PHENAZOPYRIDINE HYDROCHLORIDE 200 MG/1
200 TABLET, FILM COATED ORAL 3 TIMES DAILY PRN
Qty: 6 TABLET | Refills: 0 | Status: SHIPPED | OUTPATIENT
Start: 2025-07-25 | End: 2025-08-01

## 2025-07-25 RX ORDER — CEFPODOXIME PROXETIL 200 MG/1
200 TABLET, FILM COATED ORAL 2 TIMES DAILY
Qty: 14 TABLET | Refills: 0 | Status: SHIPPED | OUTPATIENT
Start: 2025-07-25 | End: 2025-08-01

## 2025-07-25 NOTE — ED INITIAL ASSESSMENT (HPI)
Patient c/o urgency/frequency, blood in urine and pain with urination x 1 week, worse in the last 3 days. No abdominal or back pain, no fever or chills.

## 2025-07-25 NOTE — ED PROVIDER NOTES
Patient Seen in: [facility] [place]        History  Chief Complaint   Patient presents with    Urinary Symptoms     Blood in urine, discomfort, pee urgency - Entered by patient     Stated Complaint: Urinary Symptoms - Blood in urine, discomfort, pee urgency    Subjective:   HPI              Patient is a 60-year female, presenting to immediate care for concerns for urinary tract infection.  She is symptomatic.  Onset of symptoms 1 week.  She is experiencing urinary urgency, frequency, discomfort with urination, and blood with urine.  She has history of uterine prolapse.  She was not using her pessary during this time as does not like pessary given causes her to have incontinence and urinary frequency.  She denies any fever or systemic symptoms.  No nausea or vomiting.  No back, flank, abdominal, pelvic pain.  No gross hematuria.  No vaginal bleeding.  Not immunocompromise.  No history of kidney infection or kidney stones.  No history of recurrent UTIs.      Objective:     Past Medical History:    Asthma (HCC)    Chronic sinusitis    Rheumatoid arthritis (HCC)              Past Surgical History:   Procedure Laterality Date    Colonoscopy                  No pertinent social history.            Review of Systems   Constitutional:  Negative for fever.   Respiratory:  Negative for shortness of breath.    Cardiovascular:  Negative for chest pain.   Gastrointestinal:  Negative for abdominal pain, nausea and vomiting.   Genitourinary:  Positive for dysuria, frequency, hematuria and urgency. Negative for difficulty urinating and flank pain.   Musculoskeletal:  Positive for back pain.   Allergic/Immunologic: Negative for immunocompromised state.   Psychiatric/Behavioral:  Negative for confusion.    All other systems reviewed and are negative.      Positive for stated complaint: Urinary Symptoms - Blood in urine, discomfort, pee urgency  Other systems are as noted in HPI.  Constitutional and vital signs reviewed.       All other systems reviewed and negative except as noted above.      Physical Exam    ED Triage Vitals [07/25/25 1051]   /66   Pulse 63   Resp 22   Temp 98.3 °F (36.8 °C)   Temp src Oral   SpO2 100 %   O2 Device None (Room air)       Current Vitals:   Vital Signs  BP: 123/66  Pulse: 63  Resp: 22  Temp: 98.3 °F (36.8 °C)  Temp src: Oral    Oxygen Therapy  SpO2: 100 %  O2 Device: None (Room air)            Physical Exam  Vitals and nursing note reviewed.   Constitutional:       General: She is not in acute distress.     Appearance: Normal appearance. She is not ill-appearing, toxic-appearing or diaphoretic.   HENT:      Head: Normocephalic and atraumatic.      Mouth/Throat:      Mouth: Mucous membranes are moist.   Eyes:      General: No scleral icterus.     Conjunctiva/sclera: Conjunctivae normal.   Cardiovascular:      Rate and Rhythm: Normal rate.   Pulmonary:      Effort: No respiratory distress.   Abdominal:      General: There is no distension.      Tenderness: There is no abdominal tenderness. There is no right CVA tenderness or left CVA tenderness.   Musculoskeletal:         General: Normal range of motion.   Neurological:      General: No focal deficit present.      Mental Status: She is alert and oriented to person, place, and time.      Coordination: Coordination normal.      Gait: Gait normal.   Psychiatric:         Mood and Affect: Mood normal.         Behavior: Behavior normal.           ED Course  Labs Reviewed   [facility] POCT URINALYSIS DIPSTICK - Abnormal; Notable for the following components:       Result Value    Urine Clarity Cloudy (*)     Protein urine 30 (*)     Blood, Urine Large (*)     Leukocyte esterase urine Large (*)     All other components within normal limits   URINE CULTURE, ROUTINE     Results for orders placed or performed during the hospital encounter of 07/25/25   POCT Urinalysis Dipstick    Collection Time: 07/25/25 11:02 AM   Result Value Ref Range    Urine Color Yellow  Yellow    Urine Clarity Cloudy (A) Clear    Specific Gravity, Urine 1.015 1.005 - 1.030    PH, Urine 7.0 5.0 - 8.0    Protein urine 30 (A) Negative mg/dL    Glucose, Urine Negative Negative mg/dL    Ketone, Urine Negative Negative mg/dL    Bilirubin, Urine Negative Negative    Blood, Urine Large (A) Negative    Nitrite Urine Negative Negative    Urobilinogen urine <2.0 <2.0 mg/dL    Leukocyte esterase urine Large (A) Negative        MDM    Differential diagnoses considered included, but are not exclusive of: Urinary tract infection, cystitis, pyelonephritis, nephrolithiasis, vaginitis, STI, urethritis, urinary retention, urge/stress incontinence, etc.    Dx: Acute cystitis with hematuria, initial encounter  Symptomatic  No systemic symptoms  Overall well appearing  Afebrile  Urine Dip suggestive of UTI  + cloudy urine, large leuks, hematuria   No clinical signs of obstructing nephrolithiasis or pyelonephritis  Will treat for acute uncomplicated cystitis  Rx Vantin twice daily for 7 days for UTI  Rx pyridium for dysuria  Urine culture sent, pending  PCP follow-up   Discharge instruction on bladder infection (female)  Patient understood and agrees to treatment plan  ED  precautions  Stable for discharge        Medical Decision Making      Disposition and Plan     Clinical Impression:  1. Acute cystitis with hematuria    2. UTI symptoms    3. Uterine prolapse         Disposition:  Discharge  7/25/2025 11:24 am    Follow-up:  Wally Colin DO  [address] [place] [id number]  [phone]                Medications Prescribed:  Discharge Medication List as of 7/25/2025 11:26 AM        START taking these medications    Details   cefpodoxime 200 MG Oral Tab Take 1 tablet (200 mg total) by mouth 2 (two) times daily for 7 days., Normal, Disp-14 tablet, R-0      phenazopyridine 200 MG Oral Tab Take 1 tablet (200 mg total) by mouth 3 (three) times daily as needed for Pain., Normal, Disp-6 tablet, R-0                    Supplementary Documentation:

## (undated) NOTE — Clinical Note
Marlyn - I saw Mirlande today with bulge. I've recommended vag estrogen & pessary. I will work to manage her sx. I appreciate the opportunity to participate in her care. Thanks, Kaelyn